# Patient Record
Sex: FEMALE | Race: OTHER
[De-identification: names, ages, dates, MRNs, and addresses within clinical notes are randomized per-mention and may not be internally consistent; named-entity substitution may affect disease eponyms.]

---

## 2018-08-16 NOTE — DIAGNOSTIC IMAGING REPORT
Indication: Altered level of consciousness

 

Technique: One view of the chest

 

Comparison: 2/16/2013

 

Findings: The heart is mildly enlarged. Lungs and pleural spaces are clear. No

significant change

 

Impression: No acute process

## 2018-08-16 NOTE — EMERGENCY ROOM REPORT
History of Present Illness


General


Chief Complaint:  Altered Level of Consciousness


Source:  Patient, Family Member





Present Illness


HPI


Patient presents after an episode of confusion this morning.  She called her 

daughter at 10 am and though she had spoken to her before.  The son in law 

brought the patient to the ED.  She's not had this in the past.  The daughter 

thinks she's not taking her medications properly.  She denies any fevers, nausea

, vomiting, diarrhea, dysuria, chest pain, shortness of breath, extremity pain.

  Initially she had denied headache but then states she has a 4/10 right-sided 

frontal headache at this time.  





According to her daughter she is back to normal at this time.





Patient has history of hypertension and is taking medication.





The patient was seen here 2013 for UTI.


Allergies:  


Coded Allergies:  


     No Known Allergies (Unverified , 2/20/13)





Patient History


Past Medical History:  see triage record


Social History:  Denies: smoking, alcohol use, drug use


Social History Narrative


with daughter


Pregnant Now:  No


Reviewed Nursing Documentation:  PMH: Agreed; PSxH: Agreed





Nursing Documentation-PMH


Past Medical History:  No History, Except For


Hx Hypertension:  Yes





Review of Systems


All Other Systems:  negative except mentioned in HPI





Physical Exam





Vital Signs








  Date Time  Temp Pulse Resp B/P (MAP) Pulse Ox O2 Delivery O2 Flow Rate FiO2


 


8/16/18 14:15 98.5 69 20 199/91 98 Room Air  





 98.4       








Sp02 EP Interpretation:  reviewed, normal


General Appearance:  well appearing, no apparent distress, GCS 15


Head:  normocephalic, atraumatic


Eyes:  bilateral eye normal inspection, bilateral eye PERRL, bilateral eye EOMI


ENT:  moist mucus membranes


Neck:  supple


Respiratory:  lungs clear, normal breath sounds


Cardiovascular #1:  regular rate, rhythm


Cardiovascular #2:  2+ radial (R)


Gastrointestinal:  normal inspection, normal bowel sounds, non tender, no mass, 

non-distended


Musculoskeletal:  back normal, gait/station normal, normal range of motion


Neurologic:  alert, oriented x3, CNs III-XII nml as tested, motor strength/tone 

normal, DTRs symmetric, sensory intact, cerebellar normal, speech normal


Psychiatric:  mood/affect normal


Skin:  normal inspection, warm/dry, other - Venous disease





Medical Decision Making


Diagnostic Impression:  


 Primary Impression:  


 Transient alteration of awareness


 Additional Impression:  


 Labile hypertension


ER Course


Patient presents with transient altered level of consciousness with 

hypertension.  Differential includes bleed, electrolyte abnormality, UTI or 

other occult infection, medication adverse effect, hypertensive urgency, 

seizure amongst others.  History and exam is against stroke or TIA.  Evaluation 

will be with EKG, labs CT of the head.  Patient will receive some mild IV 

hydration and we'll be monitoring her blood pressure.  Tylenol given for 

headache.





EKG without injury.  CT head - chronic changes.  CXR no abnormalities.  Labs 

with normal CBC.  Slightly elevated glucose.  Rest of labs and troponin 

negative.





Patient now remembers episode.  Neurologic exam repeated and completely normal.

  BP now normal.





No medical emergency at this time.





Patient stable for outpatient observation and treatment.





Laboratory Tests








Test


  8/16/18


15:00


 


White Blood Count


  5.0 K/UL


(4.8-10.8)


 


Red Blood Count


  4.87 M/UL


(4.20-5.40)


 


Hemoglobin


  13.5 G/DL


(12.0-16.0)


 


Hematocrit


  41.7 %


(37.0-47.0)


 


Mean Corpuscular Volume 86 FL (80-99)  


 


Mean Corpuscular Hemoglobin


  27.6 PG


(27.0-31.0)


 


Mean Corpuscular Hemoglobin


Concent 32.3 G/DL


(32.0-36.0)


 


Red Cell Distribution Width


  11.9 %


(11.6-14.8)


 


Platelet Count


  180 K/UL


(150-450)


 


Mean Platelet Volume


  7.5 FL


(6.5-10.1)


 


Neutrophils (%) (Auto)


  71.6 %


(45.0-75.0)


 


Lymphocytes (%) (Auto)


  21.8 %


(20.0-45.0)


 


Monocytes (%) (Auto)


  5.5 %


(1.0-10.0)


 


Eosinophils (%) (Auto)


  0.6 %


(0.0-3.0)


 


Basophils (%) (Auto)


  0.5 %


(0.0-2.0)


 


Urine Color Pale yellow  


 


Urine Appearance Clear  


 


Urine pH 8 (4.5-8.0)  


 


Urine Specific Gravity


  1.010


(1.005-1.035)


 


Urine Protein


  Negative


(NEGATIVE)


 


Urine Glucose (UA)


  Negative


(NEGATIVE)


 


Urine Ketones


  Negative


(NEGATIVE)


 


Urine Occult Blood


  Negative


(NEGATIVE)


 


Urine Nitrite


  Negative


(NEGATIVE)


 


Urine Bilirubin


  Negative


(NEGATIVE)


 


Urine Urobilinogen


  Normal MG/DL


(0.0-1.0)


 


Urine Leukocyte Esterase


  Negative


(NEGATIVE)


 


Sodium Level


  141 MMOL/L


(136-145)


 


Potassium Level


  3.6 MMOL/L


(3.5-5.1)


 


Chloride Level


  105 MMOL/L


()


 


Carbon Dioxide Level


  25 MMOL/L


(21-32)


 


Anion Gap


  11 mmol/L


(5-15)


 


Blood Urea Nitrogen


  8 mg/dL (7-18)


 


 


Creatinine


  0.8 MG/DL


(0.55-1.30)


 


Estimate Glomerular


Filtration Rate > 60 mL/min


(>60)


 


Glucose Level


  121 MG/DL


()  H


 


Lactic Acid Level


  1.80 mmol/L


(0.4-2.0)


 


Calcium Level


  9.5 MG/DL


(8.5-10.1)


 


Total Bilirubin


  0.4 MG/DL


(0.2-1.0)


 


Aspartate Amino Transferase


(AST) 24 U/L (15-37)


 


 


Alanine Aminotransferase (ALT)


  18 U/L (12-78)


 


 


Alkaline Phosphatase


  78 U/L


()


 


Ammonia


  19 umol/L


(11-32)


 


Troponin I


  0.000 ng/mL


(0.000-0.056)


 


Total Protein


  8.2 G/DL


(6.4-8.2)


 


Albumin


  3.8 G/DL


(3.4-5.0)


 


Globulin 4.4 g/dL  


 


Albumin/Globulin Ratio


  0.9 (1.0-2.7)


L


 


Thyroid Stimulating Hormone


(TSH) 1.921 uiU/mL


(0.358-3.740)


 


Salicylates Level


  2.4 ug/mL


(2.8-20)  L


 


Urine Opiates Screen


  Negative


(NEGATIVE)


 


Acetaminophen Level


  < 2 MCG/ML


(10-30)  L


 


Urine Barbiturates Screen


  Negative


(NEGATIVE)


 


Phencyclidine (PCP) Screen


  Negative


(NEGATIVE)


 


Urine Amphetamines Screen


  Negative


(NEGATIVE)


 


Urine Benzodiazepines Screen


  Negative


(NEGATIVE)


 


Urine Cocaine Screen


  Negative


(NEGATIVE)


 


Urine Marijuana (THC) Screen


  Negative


(NEGATIVE)


 


Serum Alcohol < 3 mg/dL  








EKG Diagnostic Results


Rate:  normal


Rhythm:  NSR


ST Segments:  no acute changes - Left atrial enlargement





Rhythm Strip Diag. Results


EP Interpretation:  yes


Rhythm:  NSR, no PVC's, no ectopy





Chest X-Ray Diagnostic Results


Chest X-Ray Diagnostic Results :  


   Chest X-Ray Ordered:  Yes


   # of Views/Limited/Complete:  1 View


   Indication:  Other


   Interpretation:  no consolidation, no effusion, no pneumothorax, other - inc 

cor


   Impression:  Other


   Electronically Signed by:  Electronically signed by Avinash Acosta MD





CT/MRI/US Diagnostic Results


CT/MRI/US Diagnostic Results :  


   Imaging Test Ordered:  head


   Impression


volume loss





Last Vital Signs








  Date Time  Temp Pulse Resp B/P (MAP) Pulse Ox O2 Delivery O2 Flow Rate FiO2


 


8/16/18 17:55 98.6 58 20 130/76 99 Room Air  








Status:  improved


Disposition:  HOME, SELF-CARE


Condition:  Improved











Avinash Acosta M.D. Aug 16, 2018 14:39

## 2018-08-16 NOTE — DIAGNOSTIC IMAGING REPORT
Indications: Altered level of consciousness

 

Technique: Spiral acquisitions obtained through the brain. Angled axial and coronal 5

x 5 mm slices were reconstructed. Total dose length product 1344.08 mGycm.  CTDI

vol(s) 70.38 mGy. Dose reduction achieved using automated exposure control

 

Comparison: None.

 

Findings: There is age-related enlargement of the ventricles and extra axial CSF

spaces. No acute intracranial hemorrhage or edema, mass effect, nor midline shift.

Normal gray-white differentiation. Intact calvarium. Visualized orbits and sinuses

are unremarkable.

 

Impression: Age-related volume loss. Negative for acute intracranial bleed or mass

effect

 

 

 

 

 

The CT scanner at [facility] is accredited by the American College of

Radiology and the scans are performed using protocols designed to limit radiation

exposure to as low as reasonably achievable to attain images of sufficient resolution

adequate for diagnostic evaluation.

## 2018-08-24 NOTE — CARDIOLOGY REPORT
--------------- APPROVED REPORT --------------





EKG Measurement

Heart [id number]

AZ [id number]

[id number]

[id number]

[id number]





Normal sinus rhythm

Possible Left atrial enlargement

Increased R/S ratio in V1, consider early transition or posterior infarct

Abnormal ECG

## 2020-12-07 ENCOUNTER — HOSPITAL ENCOUNTER (EMERGENCY)
Dept: HOSPITAL 72 - EMR | Age: 70
Discharge: ANOTHER HOSPITAL | End: 2020-12-07
Payer: MEDICARE

## 2020-12-07 VITALS — SYSTOLIC BLOOD PRESSURE: 133 MMHG | DIASTOLIC BLOOD PRESSURE: 76 MMHG

## 2020-12-07 VITALS — DIASTOLIC BLOOD PRESSURE: 67 MMHG | SYSTOLIC BLOOD PRESSURE: 151 MMHG

## 2020-12-07 VITALS — WEIGHT: 163 LBS | HEIGHT: 63 IN | BODY MASS INDEX: 28.88 KG/M2

## 2020-12-07 VITALS — SYSTOLIC BLOOD PRESSURE: 129 MMHG | DIASTOLIC BLOOD PRESSURE: 69 MMHG

## 2020-12-07 VITALS — DIASTOLIC BLOOD PRESSURE: 65 MMHG | SYSTOLIC BLOOD PRESSURE: 123 MMHG

## 2020-12-07 DIAGNOSIS — R07.9: Primary | ICD-10-CM

## 2020-12-07 DIAGNOSIS — R51.9: ICD-10-CM

## 2020-12-07 DIAGNOSIS — E78.5: ICD-10-CM

## 2020-12-07 DIAGNOSIS — R00.2: ICD-10-CM

## 2020-12-07 DIAGNOSIS — E78.00: ICD-10-CM

## 2020-12-07 DIAGNOSIS — I51.7: ICD-10-CM

## 2020-12-07 DIAGNOSIS — I10: ICD-10-CM

## 2020-12-07 DIAGNOSIS — Z90.89: ICD-10-CM

## 2020-12-07 LAB
ADD MANUAL DIFF: NO
ALBUMIN SERPL-MCNC: 4 G/DL (ref 3.4–5)
ALBUMIN/GLOB SERPL: 0.9 {RATIO} (ref 1–2.7)
ALP SERPL-CCNC: 66 U/L (ref 46–116)
ALT SERPL-CCNC: 18 U/L (ref 12–78)
ANION GAP SERPL CALC-SCNC: 7 MMOL/L (ref 5–15)
APTT BLD: 26 SEC (ref 23–33)
AST SERPL-CCNC: 21 U/L (ref 15–37)
BASOPHILS NFR BLD AUTO: 0.8 % (ref 0–2)
BILIRUB SERPL-MCNC: 0.3 MG/DL (ref 0.2–1)
BUN SERPL-MCNC: 9 MG/DL (ref 7–18)
CALCIUM SERPL-MCNC: 9.1 MG/DL (ref 8.5–10.1)
CHLORIDE SERPL-SCNC: 106 MMOL/L (ref 98–107)
CHOLEST SERPL-MCNC: 198 MG/DL (ref ?–200)
CO2 SERPL-SCNC: 28 MMOL/L (ref 21–32)
CREAT SERPL-MCNC: 0.9 MG/DL (ref 0.55–1.3)
EOSINOPHIL NFR BLD AUTO: 1.1 % (ref 0–3)
ERYTHROCYTE [DISTWIDTH] IN BLOOD BY AUTOMATED COUNT: 12.9 % (ref 11.6–14.8)
GLOBULIN SER-MCNC: 4.4 G/DL
HCT VFR BLD CALC: 42.2 % (ref 37–47)
HDLC SERPL-MCNC: 73 MG/DL (ref 40–60)
HGB BLD-MCNC: 13.6 G/DL (ref 12–16)
INR PPP: 1 (ref 0.9–1.1)
LYMPHOCYTES NFR BLD AUTO: 29 % (ref 20–45)
MCV RBC AUTO: 93 FL (ref 80–99)
MONOCYTES NFR BLD AUTO: 6.3 % (ref 1–10)
NEUTROPHILS NFR BLD AUTO: 62.8 % (ref 45–75)
PLATELET # BLD: 158 K/UL (ref 150–450)
POTASSIUM SERPL-SCNC: 3.6 MMOL/L (ref 3.5–5.1)
RBC # BLD AUTO: 4.53 M/UL (ref 4.2–5.4)
SODIUM SERPL-SCNC: 141 MMOL/L (ref 136–145)
TRIGL SERPL-MCNC: 90 MG/DL (ref 30–150)
WBC # BLD AUTO: 4.9 K/UL (ref 4.8–10.8)

## 2020-12-07 PROCEDURE — 99284 EMERGENCY DEPT VISIT MOD MDM: CPT

## 2020-12-07 PROCEDURE — 71045 X-RAY EXAM CHEST 1 VIEW: CPT

## 2020-12-07 PROCEDURE — 85025 COMPLETE CBC W/AUTO DIFF WBC: CPT

## 2020-12-07 PROCEDURE — 83880 ASSAY OF NATRIURETIC PEPTIDE: CPT

## 2020-12-07 PROCEDURE — 80053 COMPREHEN METABOLIC PANEL: CPT

## 2020-12-07 PROCEDURE — 83690 ASSAY OF LIPASE: CPT

## 2020-12-07 PROCEDURE — 84484 ASSAY OF TROPONIN QUANT: CPT

## 2020-12-07 PROCEDURE — 83036 HEMOGLOBIN GLYCOSYLATED A1C: CPT

## 2020-12-07 PROCEDURE — 85730 THROMBOPLASTIN TIME PARTIAL: CPT

## 2020-12-07 PROCEDURE — 70450 CT HEAD/BRAIN W/O DYE: CPT

## 2020-12-07 PROCEDURE — 80061 LIPID PANEL: CPT

## 2020-12-07 PROCEDURE — 85610 PROTHROMBIN TIME: CPT

## 2020-12-07 PROCEDURE — 93005 ELECTROCARDIOGRAM TRACING: CPT

## 2020-12-07 PROCEDURE — 36415 COLL VENOUS BLD VENIPUNCTURE: CPT

## 2020-12-07 NOTE — NUR
ED Nurse Note:



Patient ambulated to restroom with steady gait and reconnected to cardiac 
monitor after. No SOB noted, breathing is normal. Will continue to monitor.

## 2020-12-07 NOTE — NUR
ED Nurse Note:



Patient walked into ED for c/o palpitations onset approximately 6 hours ago. 
Patient was having chest pain associated with the palpitations, but currently 
denies chest pain at this time. She also reports headache. Patient does have hx 
of HTN. No SOB, cough, fever, abdominal pain noted. She is awake and alert, 
oriented x4. Patient is ambulatory. She is breathing normal and unlabored. 
Connected to cardiac monitor; HR is within normal limits. Safety measures in 
place.

## 2020-12-07 NOTE — DIAGNOSTIC IMAGING REPORT
EXAM:

  XR Chest, 1 View

 

CLINICAL HISTORY:

  CP

 

TECHNIQUE:

  Frontal view of the chest.

 

COMPARISON:

  8/16/18

 

FINDINGS:

  Lungs:  Unremarkable.  No consolidation.

  Pleural space:  Unremarkable.  No pneumothorax.

  Heart:  There is unchanged mild cardiomegaly.

  Mediastinum:  Unremarkable.

  Bones/joints:  Unremarkable.

  Lymph nodes:  There is a metallic cross measuring 4 x 2.5 cm likely 

representing a necklace on the patient's anterior chest.  However it is 

projected over the mid esophagus and no necklace chain identified.  

Correlation recommended to exclude ingested foreign body.

 

IMPRESSION:     

  Mild cardiomegaly without acute infiltrate.  

 

There is a metallic cross measuring 4 x 2.5 cm likely representing a 

necklace on the patient's anterior chest.  However it is projected over 

the mid esophagus and no necklace chain identified.  Correlation 

recommended to exclude ingested foreign body.

## 2020-12-07 NOTE — DIAGNOSTIC IMAGING REPORT
EXAM:

  CT Head Without Intravenous Contrast

 

CLINICAL HISTORY:

  PAIN

 

TECHNIQUE:

  Axial computed tomography images of the head/brain without intravenous 

contrast.  CTDI is 53.40 mGy and DLP is 1045.50 mGy-cm.  One or more of 

the following dose reduction techniques were used: automated exposure 

control, adjustment of the mA and/or kV according to patient size, use of 

iterative reconstruction technique.

 

COMPARISON:

  8/16/8

 

FINDINGS:

  Brain:  Unremarkable.  No hemorrhage.  No significant white matter 

disease.  No edema.

  Ventricles:  Unremarkable.  No ventriculomegaly.

  Bones/joints:  Unremarkable.  No acute fracture.

  Soft tissues:  Unremarkable.

  Sinuses:  Unremarkable as visualized.  No acute sinusitis.

  Mastoid air cells:  Unremarkable as visualized.  No mastoid effusion.

 

IMPRESSION:     

  No evidence of acute intracranial process.  Paranasal sinuses and 

mastoids are clear.

## 2020-12-07 NOTE — EMERGENCY ROOM REPORT
History of Present Illness


General


Chief Complaint:  Palpitations


Source:  Patient





Present Illness


HPI


30-year-old female with past medical history of hypertension, dyslipidemia who 

presents with her son for substernal pressure-like nonradiating chest pain since

10 pm.


Patient states that she was trying to go to sleep and suddenly felt pressure on 

her chest.  She also endorses headache and palpitations, but denies back pain, 

vision changes, weakness, slurred speech, neck pain, nausea, vomiting, diarrhea,

cough, fever, hemoptysis, melena, hematochezia or any other symptoms. Denies 

shortness of breath, hemoptysis, or history of PE/DVT


The patient's symptoms were gradual onset, severity was moderate, duration since

6 hours.  


Quality: Pressure





Past medical history: Hypertension, high cholesterol 


Past surgical history:  Appendix





Smoking:  Denies


Alcohol use:  Denies


Drug use:  Denies





Review of systems:


CONST: No fevers or chills, No night sweats


PULMONARY: No productive cough,  No shortness of breath 


CARDIAC: ++ chest pain, ++ palpitations 


GI: No vomiting, No diarrhea , No melena_or_BRBPR 


: No dysuria, No hematuria, No discharge 


NEURO: No new_focal_weakness_or_numbness, No confusion, No vision changes


14 point Review of Systems is otherwise negative except per HPI





Physical Exam:


GENERAL: Awake_alert_ nontoxic, no acute distress Spo2 98% on RA -normal


EYES: Extraocular muscles are intact. Conjunctivae clear. Lids without swelling


ENT: External nose and ear normal_in_appearance. Oropharynx clear. 

Head_atraumatic, Moist_oral_mucosa


NECK:  No JVD. No meningismus. No thyromegaly.  Supple. Trachea midline


RESP: Normal respiratory effort. Symmetric rise. No stridor. 

Clear_to_auscultation_No_rales_No_wheezes


CARDIAC: Regular rate and regular rhytm. No_significant pedal edema.


ABDOMEN: Soft. Nondistended.  Nontender_No_rebound_or_guarding.


MSK:  Normal muscle tone, without rigidity.  Extremities without asymmetric defo

rmity or swelling.  


SKIN: Warm and dry.  No visible cyanosis or pallor


NEUROLOGIC: Alert, oriented x3.  Motor_and_sensation_grossly_intact. No truncal 

ataxia. Gait_normal


Psych: Normal mood and affect, normal judgment and insight











- COORDINATION OF CARE


Case was discussed with: Patient , Patient's Family 





Any labs and imaging that were ordered were interpreted as part of the medical 

decision making:








Medical Decision Making/Plan:





Differential diagnosis includes acute myocardial infarction, acute coronary 

syndrome and unstable angina, pulmonary embolism, pneumothorax, pneumonia, and 

aortic dissection, among others.  





Patient is currently well appearing with stable vitals.   Neuro intact 


EKG shows normal sinus rhythm.  There is a T wave inversion in lead III. No 

evidence of STEMI, and initial troponin is negative.  


Chest xray shows cardiomegaly without significant pleural effusion. No evidence 

of pneumothorax, pneumonia


Labs show troponin negative, BNP WNL. 


CT head is within normal limits. 





Aspirin and nitroglycerin given.  However, given that chest pain is currently 

resolved, risks likely outweigh benefits of IV heparin at this time, so 

deferred.


The pain is not classic for pericarditis or myocarditis, and the patient has no 

significant risk factors for a pericardial effusion and has stable vitals signs,

unlikely to have tamponade.  Pain is not likely to be  pulmonary embolism, 

patient has  no significant PE risk factors.   The presentation is not 

consistent with dissection, pain is not severe, radiating to back, or tearing in

nature.  Has normal bilateral radial and pedal pulses.  


However given patients presentation and risk factors, patient will be admitted 

for serial troponins and risk stratification and evaluation for likely stress 

testing.





Care to be signed out to Dr Bhandari pending transfer.


Allergies:  


Coded Allergies:  


     No Known Allergies (Unverified , 2/20/13)





COVID-19 Screening


Contact w/high risk pt:  No


Experienced COVID-19 symptoms?:  No


COVID-19 Testing performed PTA:  No





Patient History


Last Menstrual Period:  n/a





Nursing Documentation-[facility]


Past Medical History:  No History, Except For


Hx Hypertension:  Yes





Physical Exam


Sp02 EP Interpretation:  reviewed, normal





Medical Decision Making


Diagnostic Impression:  


   Primary Impression:  


   Chest pain


   Additional Impressions:  


   HTN (hypertension)


   HLD (hyperlipidemia)


   Labile hypertension





EKG Diagnostic Results


Troponin ordered:  Yes


When was troponin ordered?:  Dec 7, 2020


PA Scribe Text


12-lead EKG (interpreted by me) 


Time: 0430


Indication: Rhythm analysis


Tracing visualized and Interpreted by me. Rhythm:  Normal sinus rhythm 


Rate:  76 bpm


QTc:  441


Morphology: No_significant_ST_elevations_or_depressions, No STEMI


Impression:  Normal_sinus_rhythm_without_significant_abnormality


T wave inversion in lead III





Rhythm Strip Diag. Results


Rhythm Strip Time:  05:25


EP Interpretation:  yes


Rate:  84


Rhythm:  NSR, no PVC's, no ectopy





Chest X-Ray Diagnostic Results


Chest X-Ray Diagnostic Results :  


   PA Scribe Text


Chest X-Ray: 


Views: [ 1 ] view(s)


Indication:  Chest pain


Findings: Cardiomegaly.  Mediastinum normal.  No infiltrate.


Impression: Cardiomegaly


The X-ray(s) were independently viewed and interpreted contemporaneously


Electronically signed by Vika giordano DO





CT/MRI/US Diagnostic Results


CT/MRI/US Diagnostic Results :  


   Impression


CT Head Without Intravenous Contrast





FINDINGS:


Brain: Unremarkable. No hemorrhage. No significant white matter disease. No 

edema.


Ventricles: Unremarkable. No ventriculomegaly.


Bones/joints: Unremarkable. No acute fracture.


Soft tissues: Unremarkable.


Sinuses: Unremarkable as visualized. No acute sinusitis.


Mastoid air cells: Unremarkable as visualized. No mastoid effusion.





IMPRESSION:


No evidence of acute intracranial process. Paranasal sinuses and mastoids are 

clear.


Reevaluation Time:  05:26


Status:  improved


Disposition:  ADMITTED AS INPATIENT - transfer to OSH


Admit Decision Time:  05:26


Condition:  Stable











Vika Caro D.O.            Dec 7, 2020 04:24

## 2020-12-07 NOTE — EMERGENCY ROOM REPORT
Physical Exam





Vital Signs








  Date Time  Temp Pulse Resp B/P (MAP) Pulse Ox O2 Delivery O2 Flow Rate FiO2


 


12/7/20 04:20 98.4 86 18 174/81 (112) 98 Room Air  











Medical Decision Making


Diagnostic Impression:  


   Primary Impression:  


   Chest pain


   Additional Impressions:  


   Labile hypertension


   HTN (hypertension)


   HLD (hyperlipidemia)


ER Course


Assumed care of the patient from the previous provider at approximately 0600.  


Please refer to initial note for full history and physical exam.





Briefly, 70-year-old female history of hypertension hyperlipidemia presenting 

for chest pain, palpitations, headache and elevated blood pressure.  EKG, chest 

x-ray, CT scan of the head and labs returned within normal limits.  Patient 

received aspirin nitroglycerin.  Headache, chest pressure improved after 

receiving nitro.  May be hypertensive urgency versus ACS.  Admit for further 

cardiac evaluation, monitoring and treatment.  Accepted by Dr. Mclean to 

[facility].  Patient is stable for transfer and will arrange 

transport.





Laboratory Tests








Test


 12/7/20


05:00


 


White Blood Count


 4.9 K/UL


(4.8-10.8)


 


Red Blood Count


 4.53 M/UL


(4.20-5.40)


 


Hemoglobin


 13.6 G/DL


(12.0-16.0)


 


Hematocrit


 42.2 %


(37.0-47.0)


 


Mean Corpuscular Volume 93 FL (80-99)  


 


Mean Corpuscular Hemoglobin


 30.1 PG


(27.0-31.0)


 


Mean Corpuscular Hemoglobin


Concent 32.3 G/DL


(32.0-36.0)


 


Red Cell Distribution Width


 12.9 %


(11.6-14.8)


 


Platelet Count


 158 K/UL


(150-450)


 


Mean Platelet Volume


 7.9 FL


(6.5-10.1)


 


Neutrophils (%) (Auto)


 62.8 %


(45.0-75.0)


 


Lymphocytes (%) (Auto)


 29.0 %


(20.0-45.0)


 


Monocytes (%) (Auto)


 6.3 %


(1.0-10.0)


 


Eosinophils (%) (Auto)


 1.1 %


(0.0-3.0)


 


Basophils (%) (Auto)


 0.8 %


(0.0-2.0)


 


Prothrombin Time


 10.7 SEC


(9.30-11.50)


 


Prothrombin Time INR 1.0 (0.9-1.1)  


 


Activated Partial


Thromboplast Time 26 SEC (23-33)





 


Sodium Level


 141 MMOL/L


(136-145)


 


Potassium Level


 3.6 MMOL/L


(3.5-5.1)


 


Chloride Level


 106 MMOL/L


()


 


Carbon Dioxide Level


 28 MMOL/L


(21-32)


 


Anion Gap


 7 mmol/L


(5-15)


 


Blood Urea Nitrogen


 9 mg/dL (7-18)





 


Creatinine


 0.9 MG/DL


(0.55-1.30)


 


Estimated Glomerular


Filtration Rate > 60 mL/min


(>60)


 


Glucose Level


 139 MG/DL


()  H


 


Hemoglobin A1c


 5.9 %


(4.3-6.0)


 


Calcium Level


 9.1 MG/DL


(8.5-10.1)


 


Total Bilirubin


 0.3 MG/DL


(0.2-1.0)


 


Aspartate Amino Transferase


(AST) 21 U/L (15-37)





 


Alanine Aminotransferase (ALT)


 18 U/L (12-78)





 


Alkaline Phosphatase


 66 U/L


()


 


Troponin I


 0.000 ng/mL


(0.000-0.056)


 


Pro-B-Type Natriuretic Peptide


 80 pg/mL


(0-125)


 


Total Protein


 8.4 G/DL


(6.4-8.2)  H


 


Albumin


 4.0 G/DL


(3.4-5.0)


 


Globulin 4.4 g/dL  


 


Albumin/Globulin Ratio


 0.9 (1.0-2.7)


L


 


Triglycerides Level


 90 MG/DL


()


 


Cholesterol Level


 198 MG/DL (<


200)


 


LDL Cholesterol


 110 mg/dL


(<100)  H


 


HDL Cholesterol


 73 MG/DL


(40-60)  H


 


Cholesterol/HDL Ratio


 2.7 (3.3-4.4)


L


 


Lipase


 309 U/L


()











Last Vital Signs








  Date Time  Temp Pulse Resp B/P (MAP) Pulse Ox O2 Delivery O2 Flow Rate FiO2


 


12/7/20 06:15 98.4 78 18 123/65 100 Room Air  








Disposition:  SHORT-TERM HOSP


Condition:  Stable


Referrals:  


[facility]REFERR[facility] (PCP)











Sawyer Bhandari MD               Dec 7, 2020 07:58

## 2020-12-07 NOTE — NUR
ED Nurse Note:



Patient returned from CT in stable conditon. She denies active chest pain at 
this time. MD bedside speaking with patient.

## 2020-12-07 NOTE — NUR
ER DISCHARGE NOTE: Patient is cleared to be  transferred per ERMD, pt is aox4, 
on room air, with stable vital signs. pt id band removed, but patient 
discharging with IV. pt is able to ambulate with steady gait. pt took all 
belongings with her. Pt discharged safely via gurney accompanied by ALS unit en 
route to [facility].

## 2020-12-07 NOTE — NUR
ED Nurse Note: Report received from LAYLA Verdugo. Pt in stable condition, 
resting in bed. No signs of acute distress noted. Respirations even and 
unlabored on room air. Vitals stable as documented. A+Ox4, speaking in complete 
sentences.